# Patient Record
Sex: FEMALE | Race: BLACK OR AFRICAN AMERICAN | NOT HISPANIC OR LATINO | Employment: STUDENT | URBAN - METROPOLITAN AREA
[De-identification: names, ages, dates, MRNs, and addresses within clinical notes are randomized per-mention and may not be internally consistent; named-entity substitution may affect disease eponyms.]

---
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
## 2023-01-31 ENCOUNTER — HOSPITAL ENCOUNTER (EMERGENCY)
Facility: HOSPITAL | Age: 18
Discharge: HOME | End: 2023-01-31
Attending: EMERGENCY MEDICINE

## 2023-01-31 VITALS
OXYGEN SATURATION: 98 % | TEMPERATURE: 97.9 F | RESPIRATION RATE: 18 BRPM | DIASTOLIC BLOOD PRESSURE: 72 MMHG | WEIGHT: 162 LBS | SYSTOLIC BLOOD PRESSURE: 119 MMHG | HEART RATE: 70 BPM | BODY MASS INDEX: 24.63 KG/M2

## 2023-01-31 DIAGNOSIS — J01.90 ACUTE NON-RECURRENT SINUSITIS, UNSPECIFIED LOCATION: Primary | ICD-10-CM

## 2023-01-31 RX ORDER — FLUTICASONE PROPIONATE 50 MCG
1 SPRAY, SUSPENSION (ML) NASAL DAILY
Qty: 16 G | Refills: 0 | Status: SHIPPED | OUTPATIENT
Start: 2023-01-31

## 2023-01-31 RX ORDER — IBUPROFEN 600 MG/1
600 TABLET ORAL ONCE
Status: COMPLETED | OUTPATIENT
Start: 2023-01-31 | End: 2023-01-31

## 2023-01-31 RX ORDER — OXYCODONE HYDROCHLORIDE 5 MG/1
5 TABLET ORAL ONCE
Status: COMPLETED | OUTPATIENT
Start: 2023-01-31 | End: 2023-01-31

## 2023-01-31 RX ORDER — OXYMETAZOLINE HYDROCHLORIDE 0.05 G/100ML
2 SPRAY NASAL ONCE
Status: COMPLETED | OUTPATIENT
Start: 2023-01-31 | End: 2023-01-31

## 2023-01-31 RX ADMIN — OXYCODONE HYDROCHLORIDE 5 MG: 5 TABLET ORAL at 02:27

## 2023-01-31 RX ADMIN — IBUPROFEN 600 MG: 600 TABLET, FILM COATED ORAL at 01:57

## 2023-01-31 RX ADMIN — DEXAMETHASONE SODIUM PHOSPHATE 10 MG: 10 INJECTION, SOLUTION INTRAMUSCULAR; INTRAVENOUS at 01:56

## 2023-01-31 RX ADMIN — OXYMETAZOLINE HYDROCHLORIDE 2 SPRAY: 0.05 SPRAY NASAL at 01:57

## 2023-01-31 NOTE — ED PROVIDER NOTES
History  Chief Complaint   Patient presents with   • Facial Swelling     Pt c/o pressure around nose and swelling to face R > L side  With right side jaw pain and headache  Started about 2 days prior  43-year-old female, presenting with facial pressure and pain  Patient reports pain in bilateral maxillary face, worse on right side radiating down to lower face  Symptoms started 2 days ago, constant with no known modifying factors  Reports associated congestion, denies any fevers or cough  Currently smokes tobacco       History provided by:  Patient   used: No        Prior to Admission Medications   Prescriptions Last Dose Informant Patient Reported? Taking?   folic acid (FOLVITE) 1 mg tablet   No No   Sig: Take 1 tablet (1 mg total) by mouth daily   Patient not taking: Reported on 10/7/2022   ibuprofen (MOTRIN) 400 mg tablet   Yes No   Sig: Take by mouth every 6 (six) hours as needed for mild pain   Patient not taking: Reported on 9/1/2021   levETIRAcetam (KEPPRA) 750 mg tablet   No No   Sig: Take half a tab twice a day for 7 days, then one tab twice a day     Patient not taking: Reported on 10/7/2022   ondansetron (ZOFRAN-ODT) 4 mg disintegrating tablet   No No   Sig: Take 1 tablet (4 mg total) by mouth every 6 (six) hours as needed for nausea for up to 15 doses   Patient not taking: Reported on 10/7/2022      Facility-Administered Medications: None       Past Medical History:   Diagnosis Date   • Chronic disease of adenoids 5/1/2019    Referred to ENT   • Chronic tonsillar hypertrophy 6/11/2019   • Chronic tonsillitis 6/11/2019   • Enlarged lymph node in neck 5/1/2019    Secondary to adenoids and chronic sinus problems   • Lymph nodes enlarged     right larger than the left   • Seizure [facility])        Past Surgical History:   Procedure Laterality Date   • ID TONSILLECTOMY PRIMARY/SECONDARY AGE 10/> N/A 7/16/2019    Procedure: TONSILLECTOMY;  Surgeon: Deb Lewis MD;  Location: [address] [place] [address]; Service: ENT       Family History   Problem Relation Age of Onset   • Asthma Mother    • Diabetes Father      I have reviewed and agree with the history as documented  E-Cigarette/Vaping   • E-Cigarette Use Current Every Day User      E-Cigarette/Vaping Substances   • Nicotine Yes      Social History     Tobacco Use   • Smoking status: Some Days     Types: Cigarettes   • Smokeless tobacco: Never   • Tobacco comments:     Vaping   Vaping Use   • Vaping Use: Every day   • Substances: Nicotine   Substance Use Topics   • Alcohol use: Yes     Comment: no regular alcohol consumption, social on occasion   • Drug use: Yes     Types: Marijuana       Review of Systems   Constitutional: Negative  Negative for fever  HENT: Positive for congestion, sinus pressure and sinus pain  Eyes: Negative  Respiratory: Negative  Cardiovascular: Negative  Gastrointestinal: Negative  Skin: Negative  Physical Exam  Physical Exam  Vitals and nursing note reviewed  Constitutional:       General: She is not in acute distress  HENT:      Head: Normocephalic and atraumatic  Comments: No facial swelling  Mild right maxillary sinus tenderness  Right Ear: Tympanic membrane and ear canal normal       Left Ear: Tympanic membrane and ear canal normal       Nose: Congestion present  Comments: Bilateral turbinate swelling noted     Mouth/Throat:      Mouth: Mucous membranes are moist       Pharynx: Oropharynx is clear  No oropharyngeal exudate or posterior oropharyngeal erythema  Comments: No posterior oropharynx swelling or erythema  No gum erythema or swelling, no dental tenderness  Eyes:      Extraocular Movements: Extraocular movements intact  Pupils: Pupils are equal, round, and reactive to light  Cardiovascular:      Rate and Rhythm: Normal rate and regular rhythm  Pulmonary:      Effort: Pulmonary effort is normal       Breath sounds: Normal breath sounds     Abdominal:      Palpations: Abdomen is soft  Tenderness: There is no abdominal tenderness  Musculoskeletal:         General: Normal range of motion  Cervical back: Normal range of motion and neck supple  No rigidity or tenderness  Lymphadenopathy:      Cervical: No cervical adenopathy  Skin:     General: Skin is warm and dry  Neurological:      General: No focal deficit present  Mental Status: She is alert and oriented to person, place, and time  Motor: No weakness  Gait: Gait normal          Vital Signs  ED Triage Vitals [01/31/23 0142]   Temperature Pulse Respirations Blood Pressure SpO2   97 9 °F (36 6 °C) 91 18 134/85 100 %      Temp Source Heart Rate Source Patient Position - Orthostatic VS BP Location FiO2 (%)   Tympanic Monitor Sitting Right arm --      Pain Score       10 - Worst Possible Pain           Vitals:    01/31/23 0142   BP: 134/85   Pulse: 91   Patient Position - Orthostatic VS: Sitting         Visual Acuity      ED Medications  Medications   oxymetazoline (AFRIN) 0 05 % nasal spray 2 spray (2 sprays Each Nare Given 1/31/23 0157)   ibuprofen (MOTRIN) tablet 600 mg (600 mg Oral Given 1/31/23 0157)   dexamethasone oral liquid 10 mg 1 mL (10 mg Oral Given 1/31/23 0156)   oxyCODONE (ROXICODONE) IR tablet 5 mg (5 mg Oral Given 1/31/23 0227)       Diagnostic Studies  Results Reviewed     None                 No orders to display              Procedures  Procedures         ED Course  ED Course as of 01/31/23 0359 Tue Jan 31, 2023 0359 Patient sleeping, in no distress  Woken up, states she feels better  Feels likely related to acute sinusitis, no antibiotics indicated  Given Decadron in ED, instructed to use Flonase as well as over-the-counter medications as needed  Instructed to follow-up or return for any worsening or new concerning symptoms           CRAFFT    Flowsheet Row Most Recent Value   SBIRT (13-21 yo)    In order to provide better care to our patients, we are screening all of our patients for alcohol and drug use  Would it be okay to ask you these screening questions? No Filed at: 01/31/2023 0230                                          Medical Decision Making  73-year-old female, presenting with facial pain  Differential diagnosis includes sinusitis, dental abscess, cellulitis among other diagnoses  On exam, patient with no facial swelling, mild tenderness to right maxillary sinus  Patient noted to have nasal congestion and swelling, symptoms likely related to acute sinusitis  Will give medications in ED, monitor and reevaluate  Amount and/or Complexity of Data Reviewed  External Data Reviewed: notes  Risk  OTC drugs  Prescription drug management  Disposition  Final diagnoses:   Acute non-recurrent sinusitis, unspecified location     Time reflects when diagnosis was documented in both MDM as applicable and the Disposition within this note     Time User Action Codes Description Comment    1/31/2023  3:58 AM Joaquin Burch [J01 [id number]] Acute non-recurrent sinusitis, unspecified location       ED Disposition     ED Disposition   Discharge    Condition   Stable    Date/Time   Tue Jan 31, 2023  3:58 AM    Comment   Maria De Jesus Cheatum discharge to home/self care  Follow-up Information     Follow up With Specialties Details Why Contact Mirta Tejeda MD Internal Medicine   [address]  [phone]            Patient's Medications   Discharge Prescriptions    FLUTICASONE (FLONASE) 50 MCG/ACT NASAL SPRAY    1 spray into each nostril daily       Start Date: 1/31/2023 End Date: --       Order Dose: 1 spray       Quantity: 16 g    Refills: 0       No discharge procedures on file      PDMP Review     None          ED Provider  Electronically Signed by           Milena Webster MD  01/31/23 8336

## 2025-03-05 ENCOUNTER — APPOINTMENT (EMERGENCY)
Dept: RADIOLOGY | Facility: HOSPITAL | Age: 20
End: 2025-03-05

## 2025-03-05 ENCOUNTER — HOSPITAL ENCOUNTER (EMERGENCY)
Facility: HOSPITAL | Age: 20
Discharge: HOME | End: 2025-03-05
Attending: EMERGENCY MEDICINE

## 2025-03-05 VITALS
HEART RATE: 72 BPM | OXYGEN SATURATION: 100 % | SYSTOLIC BLOOD PRESSURE: 105 MMHG | DIASTOLIC BLOOD PRESSURE: 71 MMHG | WEIGHT: 170 LBS | TEMPERATURE: 99.1 F | RESPIRATION RATE: 20 BRPM | BODY MASS INDEX: 25.85 KG/M2

## 2025-03-05 DIAGNOSIS — R56.9 SEIZURE (HCC): Primary | ICD-10-CM

## 2025-03-05 LAB
ALBUMIN SERPL BCG-MCNC: 4.5 G/DL (ref 3.5–5)
ALP SERPL-CCNC: 53 U/L (ref 34–104)
ALT SERPL W P-5'-P-CCNC: 9 U/L (ref 7–52)
ANION GAP SERPL CALCULATED.3IONS-SCNC: 11 MMOL/L (ref 4–13)
AST SERPL W P-5'-P-CCNC: 15 U/L (ref 13–39)
BASOPHILS # BLD AUTO: 0.03 THOUSANDS/ÂΜL (ref 0–0.1)
BASOPHILS NFR BLD AUTO: 1 % (ref 0–1)
BILIRUB SERPL-MCNC: 0.28 MG/DL (ref 0.2–1)
BUN SERPL-MCNC: 12 MG/DL (ref 5–25)
CALCIUM SERPL-MCNC: 9.5 MG/DL (ref 8.4–10.2)
CHLORIDE SERPL-SCNC: 107 MMOL/L (ref 96–108)
CO2 SERPL-SCNC: 19 MMOL/L (ref 21–32)
CREAT SERPL-MCNC: 0.85 MG/DL (ref 0.6–1.3)
EOSINOPHIL # BLD AUTO: 0.08 THOUSAND/ÂΜL (ref 0–0.61)
EOSINOPHIL NFR BLD AUTO: 1 % (ref 0–6)
ERYTHROCYTE [DISTWIDTH] IN BLOOD BY AUTOMATED COUNT: 15.8 % (ref 11.6–15.1)
FLUAV AG UPPER RESP QL IA.RAPID: NEGATIVE
FLUBV AG UPPER RESP QL IA.RAPID: NEGATIVE
GFR SERPL CREATININE-BSD FRML MDRD: 98 ML/MIN/1.73SQ M
GLUCOSE SERPL-MCNC: 95 MG/DL (ref 65–140)
HCG SERPL QL: NEGATIVE
HCT VFR BLD AUTO: 35.7 % (ref 34.8–46.1)
HGB BLD-MCNC: 10.7 G/DL (ref 11.5–15.4)
IMM GRANULOCYTES # BLD AUTO: 0.02 THOUSAND/UL (ref 0–0.2)
IMM GRANULOCYTES NFR BLD AUTO: 0 % (ref 0–2)
LYMPHOCYTES # BLD AUTO: 1.11 THOUSANDS/ÂΜL (ref 0.6–4.47)
LYMPHOCYTES NFR BLD AUTO: 19 % (ref 14–44)
MCH RBC QN AUTO: 27 PG (ref 26.8–34.3)
MCHC RBC AUTO-ENTMCNC: 30 G/DL (ref 31.4–37.4)
MCV RBC AUTO: 90 FL (ref 82–98)
MONOCYTES # BLD AUTO: 0.44 THOUSAND/ÂΜL (ref 0.17–1.22)
MONOCYTES NFR BLD AUTO: 8 % (ref 4–12)
NEUTROPHILS # BLD AUTO: 4.11 THOUSANDS/ÂΜL (ref 1.85–7.62)
NEUTS SEG NFR BLD AUTO: 71 % (ref 43–75)
NRBC BLD AUTO-RTO: 0 /100 WBCS
PLATELET # BLD AUTO: 263 THOUSANDS/UL (ref 149–390)
PMV BLD AUTO: 10.7 FL (ref 8.9–12.7)
POTASSIUM SERPL-SCNC: 4.1 MMOL/L (ref 3.5–5.3)
PROT SERPL-MCNC: 7.7 G/DL (ref 6.4–8.4)
RBC # BLD AUTO: 3.96 MILLION/UL (ref 3.81–5.12)
SARS-COV+SARS-COV-2 AG RESP QL IA.RAPID: NEGATIVE
SODIUM SERPL-SCNC: 137 MMOL/L (ref 135–147)
WBC # BLD AUTO: 5.79 THOUSAND/UL (ref 4.31–10.16)

## 2025-03-05 PROCEDURE — 85025 COMPLETE CBC W/AUTO DIFF WBC: CPT | Performed by: EMERGENCY MEDICINE

## 2025-03-05 PROCEDURE — 99284 EMERGENCY DEPT VISIT MOD MDM: CPT | Performed by: EMERGENCY MEDICINE

## 2025-03-05 PROCEDURE — 99284 EMERGENCY DEPT VISIT MOD MDM: CPT

## 2025-03-05 PROCEDURE — 87811 SARS-COV-2 COVID19 W/OPTIC: CPT | Performed by: EMERGENCY MEDICINE

## 2025-03-05 PROCEDURE — 36415 COLL VENOUS BLD VENIPUNCTURE: CPT | Performed by: EMERGENCY MEDICINE

## 2025-03-05 PROCEDURE — 87804 INFLUENZA ASSAY W/OPTIC: CPT | Performed by: EMERGENCY MEDICINE

## 2025-03-05 PROCEDURE — 70450 CT HEAD/BRAIN W/O DYE: CPT

## 2025-03-05 PROCEDURE — 80053 COMPREHEN METABOLIC PANEL: CPT | Performed by: EMERGENCY MEDICINE

## 2025-03-05 PROCEDURE — 96365 THER/PROPH/DIAG IV INF INIT: CPT

## 2025-03-05 PROCEDURE — 84703 CHORIONIC GONADOTROPIN ASSAY: CPT | Performed by: EMERGENCY MEDICINE

## 2025-03-05 RX ORDER — ACETAMINOPHEN 10 MG/ML
1000 INJECTION, SOLUTION INTRAVENOUS ONCE
Status: COMPLETED | OUTPATIENT
Start: 2025-03-05 | End: 2025-03-05

## 2025-03-05 RX ORDER — GINSENG 100 MG
1 CAPSULE ORAL ONCE
Status: COMPLETED | OUTPATIENT
Start: 2025-03-05 | End: 2025-03-05

## 2025-03-05 RX ORDER — DIVALPROEX SODIUM 500 MG/1
500 TABLET, DELAYED RELEASE ORAL EVERY 12 HOURS SCHEDULED
Qty: 60 TABLET | Refills: 0 | Status: SHIPPED | OUTPATIENT
Start: 2025-03-05 | End: 2025-04-04

## 2025-03-05 RX ADMIN — BACITRACIN ZINC 1 SMALL APPLICATION: 500 OINTMENT TOPICAL at 12:03

## 2025-03-05 RX ADMIN — ACETAMINOPHEN 1000 MG: 10 INJECTION INTRAVENOUS at 12:03

## 2025-03-05 NOTE — ED NOTES
Pt requesting coffee. Provider approved and coffee brought to bedside.      France Carroll RN  03/05/25 3496

## 2025-03-05 NOTE — DISCHARGE INSTRUCTIONS
Follow-up with neurology for further care. Contact info provided below if needed.  Use over the counter medications as stated on the bottle as needed for pain control.  Take your new medications as prescribed until seen by neurology.  Keep wound clean.   Return to the ED with new or worsening symptoms.

## 2025-03-05 NOTE — Clinical Note
Maria De Jesus Torres was seen and treated in our emergency department on 3/5/2025.                Diagnosis: Seizure    Maria De Jesus  .    She may return on this date:          If you have any questions or concerns, please don't hesitate to call.      Margoth Barriga MD    ______________________________           _______________          _______________  Hospital Representative                              Date                                Time

## 2025-03-05 NOTE — ED PROVIDER NOTES
Time reflects when diagnosis was documented in both MDM as applicable and the Disposition within this note       Time User Action Codes Description Comment    3/5/2025  1:21 PM Margoth Barriga Add [R56.9] Seizure (HCC)           ED Disposition       ED Disposition   Discharge    Condition   Stable    Date/Time   Wed Mar 5, 2025  1:21 PM    Comment   Maria De Jesusderek Torres discharge to home/self care.                   Assessment & Plan       Medical Decision Making  Pt is a 18yo F who presents with seizure.     Likely secondary to underlying epilepsy not on AEDs.  However, will obtain blood work and CT scan to rule out any potential trigger or underlying cause.  See ED course for results and details.    Plan to discharge pt with f/u to neurology. Discussed returning the ED with new or worsening of symptoms. Discussed use of over the counter medications as stated on the bottle as needed for pain. Discussed taking new medication as prescribed until seen by neurology. Pt expressed understanding of discharge instructions, return precautions, and medication instructions and is stable for discharge at this time. All questions were answered and pt was discharged without incident.         Amount and/or Complexity of Data Reviewed  Labs: ordered. Decision-making details documented in ED Course.  Radiology: ordered. Decision-making details documented in ED Course.    Risk  OTC drugs.  Prescription drug management.        ED Course as of 03/05/25 1327   Wed Mar 05, 2025   1156 CBC and differential(!)  Reviewed and without actionable derangement.    1211 FLU/COVID Rapid Antigen (30 min. TAT) - Preferred screening test in ED  Negative.    1215 Comprehensive metabolic panel(!)  Reviewed and without actionable derangement.    1215 Carbon Dioxide(!): 19  Likely 2/2 seizure activity.    1222 PREGNANCY, SERUM: Negative  Negative.    1307 CT being read.    1311 CT head wo contrast  No acute intracranial abnormality.   1320 Discussed with  neurology as pt does not want to restart Keppra. Neurology recommending depakote. Pt made aware and is agreeable.        Medications   acetaminophen (Ofirmev) injection 1,000 mg (0 mg Intravenous Stopped 3/5/25 1226)   bacitracin topical ointment 1 small application (1 small application Topical Given 3/5/25 1203)       ED Risk Strat Scores                                                History of Present Illness       Chief Complaint   Patient presents with    Seizure - Prior Hx Of     Pt was at work had seizure lasting 4 min , pt had controlled fall to ground per witnesses abrasion to right side of head, pt denies taking meds for seizure. Pt co ha a/o x 4        Past Medical History:   Diagnosis Date    Chronic disease of adenoids 5/1/2019    Referred to ENT    Chronic tonsillar hypertrophy 6/11/2019    Chronic tonsillitis 6/11/2019    Enlarged lymph node in neck 5/1/2019    Secondary to adenoids and chronic sinus problems    Lymph nodes enlarged     right larger than the left    Seizure (HCC)       Past Surgical History:   Procedure Laterality Date    ND TONSILLECTOMY PRIMARY/SECONDARY AGE 10/> N/A 7/16/2019    Procedure: TONSILLECTOMY;  Surgeon: Fred Gardiner MD;  Location: [facility];  Service: ENT      Family History   Problem Relation Age of Onset    Asthma Mother     Diabetes Father       Social History     Tobacco Use    Smoking status: Some Days     Types: Cigarettes    Smokeless tobacco: Never    Tobacco comments:     Vaping   Vaping Use    Vaping status: Every Day    Substances: Nicotine   Substance Use Topics    Alcohol use: Yes     Comment: no regular alcohol consumption, social on occasion    Drug use: Yes     Types: Marijuana      E-Cigarette/Vaping    E-Cigarette Use Current Every Day User       E-Cigarette/Vaping Substances    Nicotine Yes       I have reviewed and agree with the history as documented.     Pt is a 20yo F who presents for seizure.  Patient reports history of epilepsy and states she is  not currently on any antiepileptics.  Patient reports she has not been for approximately 3 years.  Patient states she took herself off the medication as it made her seizures more frequent.  Patient does not remember what she was on, however per chart review it appears she was on Keppra.  Patient states in January she had 3 seizures but has not had any since that time.  Patient reports today she does not remember exactly what happened, however did wake up on the ground while at work.  Patient denying any complaints at this time.  Patient states she is otherwise healthy.        Objective       ED Triage Vitals [03/05/25 1122]   Temperature Pulse Blood Pressure Respirations SpO2 Patient Position - Orthostatic VS   99.1 °F (37.3 °C) 80 105/71 20 98 % Sitting      Temp Source Heart Rate Source BP Location FiO2 (%) Pain Score    Oral -- Right arm -- 8      Vitals      Date and Time Temp Pulse SpO2 Resp BP Pain Score FACES Pain Rating User   03/05/25 1322 -- 72 100 % -- -- -- -- RG   03/05/25 1252 -- 75 99 % -- -- -- -- RG   03/05/25 1222 -- 91 100 % -- -- -- -- RG   03/05/25 1152 -- 84 99 % -- -- -- -- RG   03/05/25 1122 -- -- -- -- 105/71 -- -- KR   03/05/25 1122 99.1 °F (37.3 °C) 80 98 % 20 -- 8 -- RG            Physical Exam  Vitals reviewed.   Constitutional:       General: She is not in acute distress.     Appearance: She is well-developed. She is not toxic-appearing or diaphoretic.   HENT:      Head: Normocephalic.        Right Ear: External ear normal.      Left Ear: External ear normal.      Nose: Nose normal.      Mouth/Throat:      Pharynx: Oropharynx is clear.   Eyes:      Extraocular Movements: Extraocular movements intact.      Pupils: Pupils are equal, round, and reactive to light.   Cardiovascular:      Rate and Rhythm: Normal rate and regular rhythm.      Heart sounds: Normal heart sounds.   Pulmonary:      Effort: Pulmonary effort is normal. No respiratory distress.      Breath sounds: Normal breath  sounds.   Abdominal:      General: There is no distension.      Palpations: Abdomen is soft.      Tenderness: There is no abdominal tenderness.   Musculoskeletal:         General: Normal range of motion.      Cervical back: Normal range of motion and neck supple.   Skin:     General: Skin is warm and dry.      Capillary Refill: Capillary refill takes less than 2 seconds.      Coloration: Skin is not pale.      Findings: No rash.   Neurological:      General: No focal deficit present.      Mental Status: She is alert and oriented to person, place, and time.      Cranial Nerves: No cranial nerve deficit.      Sensory: No sensory deficit.      Motor: No weakness.      Coordination: Coordination normal.      Gait: Gait normal.   Psychiatric:         Speech: Speech normal.         Behavior: Behavior is cooperative.         Results Reviewed       Procedure Component Value Units Date/Time    hCG, qualitative pregnancy [[id number]]  (Normal) Collected: 03/05/25 1148    Lab Status: Final result Specimen: Blood from Arm, Left Updated: 03/05/25 1220     Preg, Serum Negative    Comprehensive metabolic panel [[id number]]  (Abnormal) Collected: 03/05/25 1148    Lab Status: Final result Specimen: Blood from Arm, Left Updated: 03/05/25 1214     Sodium 137 mmol/L      Potassium 4.1 mmol/L      Chloride 107 mmol/L      CO2 19 mmol/L      ANION GAP 11 mmol/L      BUN 12 mg/dL      Creatinine 0.85 mg/dL      Glucose 95 mg/dL      Calcium 9.5 mg/dL      AST 15 U/L      ALT 9 U/L      Alkaline Phosphatase 53 U/L      Total Protein 7.7 g/dL      Albumin 4.5 g/dL      Total Bilirubin 0.28 mg/dL      eGFR 98 ml/min/1.73sq m     Narrative:      National Kidney Disease Foundation guidelines for Chronic Kidney Disease (CKD):     Stage 1 with normal or high GFR (GFR > 90 mL/min/1.73 square meters)    Stage 2 Mild CKD (GFR = 60-89 mL/min/1.73 square meters)    Stage 3A Moderate CKD (GFR = 45-59 mL/min/1.73 square meters)    Stage 3B Moderate CKD  (GFR = 30-44 mL/min/1.73 square meters)    Stage 4 Severe CKD (GFR = 15-29 mL/min/1.73 square meters)    Stage 5 End Stage CKD (GFR <15 mL/min/1.73 square meters)  Note: GFR calculation is accurate only with a steady state creatinine    FLU/COVID Rapid Antigen (30 min. TAT) - Preferred screening test in ED [[id number]]  (Normal) Collected: 03/05/25 1148    Lab Status: Final result Specimen: Nares from Nose Updated: 03/05/25 1211     SARS COV Rapid Antigen Negative     Influenza A Rapid Antigen Negative     Influenza B Rapid Antigen Negative    Narrative:      This test has been performed using the [employer] [place] 2 FLU+SARS Antigen test under the Emergency Use Authorization (EUA). This test has been validated by the  and verified by the performing laboratory. The [place] uses lateral flow immunofluorescent sandwich assay to detect SARS-COV, Influenza A and Influenza B Antigen.     The [employer] [place] 2 SARS Antigen test does not differentiate between SARS-CoV and SARS-CoV-2.     Negative results are presumptive and may be confirmed with a molecular assay, if necessary, for patient management. Negative results do not rule out SARS-CoV-2 or influenza infection and should not be used as the sole basis for treatment or patient management decisions. A negative test result may occur if the level of antigen in a sample is below the limit of detection of this test.     Positive results are indicative of the presence of viral antigens, but do not rule out bacterial infection or co-infection with other viruses.     All test results should be used as an adjunct to clinical observations and other information available to the provider.    FOR PEDIATRIC PATIENTS - copy/paste COVID Guidelines URL to browser: [email or web address]    CBC and differential [[id number]]  (Abnormal) Collected: 03/05/25 1148    Lab Status: Final result Specimen: Blood from Arm, Left Updated:  25 1155     WBC 5.79 Thousand/uL      RBC 3.96 Million/uL      Hemoglobin 10.7 g/dL      Hematocrit 35.7 %      MCV 90 fL      MCH 27.0 pg      MCHC 30.0 g/dL      RDW 15.8 %      MPV 10.7 fL      Platelets 263 Thousands/uL      nRBC 0 /100 WBCs      Segmented % 71 %      Immature Grans % 0 %      Lymphocytes % 19 %      Monocytes % 8 %      Eosinophils Relative 1 %      Basophils Relative 1 %      Absolute Neutrophils 4.11 Thousands/µL      Absolute Immature Grans 0.02 Thousand/uL      Absolute Lymphocytes 1.11 Thousands/µL      Absolute Monocytes 0.44 Thousand/µL      Eosinophils Absolute 0.08 Thousand/µL      Basophils Absolute 0.03 Thousands/µL             CT head wo contrast   Final Interpretation by Pablo Perez MD ( 130)      No acute intracranial abnormality. Consider follow-up MRI brain seizure protocol with and without contrast for further evaluation.                  Workstation performed: [id number]             Procedures    ED Medication and Procedure Management   Prior to Admission Medications   Prescriptions Last Dose Informant Patient Reported? Taking?   fluticasone (FLONASE) 50 mcg/act nasal spray   No No   Si spray into each nostril daily   folic acid (FOLVITE) 1 mg tablet   No No   Sig: Take 1 tablet (1 mg total) by mouth daily   Patient not taking: Reported on 10/7/2022   ibuprofen (MOTRIN) 400 mg tablet   Yes No   Sig: Take by mouth every 6 (six) hours as needed for mild pain   Patient not taking: Reported on 2021   levETIRAcetam (KEPPRA) 750 mg tablet   No No   Sig: Take half a tab twice a day for 7 days, then one tab twice a day.   Patient not taking: Reported on 10/7/2022   ondansetron (ZOFRAN-ODT) 4 mg disintegrating tablet   No No   Sig: Take 1 tablet (4 mg total) by mouth every 6 (six) hours as needed for nausea for up to 15 doses   Patient not taking: Reported on 10/7/2022      Facility-Administered Medications: None     Patient's Medications   Discharge  Prescriptions    DIVALPROEX SODIUM (DEPAKOTE) 500 MG DR TABLET    Take 1 tablet (500 mg total) by mouth every 12 (twelve) hours       Start Date: 3/5/2025  End Date: 4/4/2025       Order Dose: 500 mg       Quantity: 60 tablet    Refills: 0     No discharge procedures on file.  ED SEPSIS DOCUMENTATION   Time reflects when diagnosis was documented in both MDM as applicable and the Disposition within this note       Time User Action Codes Description Comment    3/5/2025  1:21 PM Margoth Barriga Add [R56.9] Seizure (HCC)                  Margoth Barriga MD  03/05/25 1327

## 2025-03-17 ENCOUNTER — OFFICE VISIT (OUTPATIENT)
Age: 20
End: 2025-03-17

## 2025-03-17 VITALS
BODY MASS INDEX: 25.2 KG/M2 | OXYGEN SATURATION: 98 % | WEIGHT: 176 LBS | HEIGHT: 70 IN | TEMPERATURE: 98.3 F | DIASTOLIC BLOOD PRESSURE: 63 MMHG | HEART RATE: 76 BPM | SYSTOLIC BLOOD PRESSURE: 124 MMHG | RESPIRATION RATE: 17 BRPM

## 2025-03-17 DIAGNOSIS — Z11.59 NEED FOR HEPATITIS C SCREENING TEST: ICD-10-CM

## 2025-03-17 DIAGNOSIS — Z23 ENCOUNTER FOR IMMUNIZATION: ICD-10-CM

## 2025-03-17 DIAGNOSIS — Z11.4 SCREENING FOR HIV (HUMAN IMMUNODEFICIENCY VIRUS): ICD-10-CM

## 2025-03-17 DIAGNOSIS — G40.409 OTHER GENERALIZED EPILEPSY, NOT INTRACTABLE, WITHOUT STATUS EPILEPTICUS (HCC): Primary | ICD-10-CM

## 2025-03-17 DIAGNOSIS — Z59.9 FINANCIAL DIFFICULTIES: ICD-10-CM

## 2025-03-17 PROCEDURE — 99203 OFFICE O/P NEW LOW 30 MIN: CPT | Performed by: FAMILY MEDICINE

## 2025-03-17 SDOH — ECONOMIC STABILITY - INCOME SECURITY: PROBLEM RELATED TO HOUSING AND ECONOMIC CIRCUMSTANCES, UNSPECIFIED: Z59.9

## 2025-03-17 NOTE — PROGRESS NOTES
Adult Annual Physical  Name: Maria De Jesus Torres      : 2005      MRN: [id number]  Encounter Provider: Hollis Byrd MD  Encounter Date: 3/17/2025   Encounter department: [facility]    Assessment & Plan  Other generalized epilepsy, not intractable, without status epilepticus (HCC)  Patient presents to the clinic to get her physical form filled out.  Patient has a history of seizures.  First seizure was in .  Patient was initially started on Keppra which she stopped taking as she felt it was making her seizures more frequent.   Patient presented to the ED 10 days ago with another seizure episode.  Patient was at work, had a seizure lasting for few minutes and fell to the ground to the right side of her head.  Imaging was unremarkable.  Patient was started on Depakote 100 mg every 12 hours.  He has been compliant with her medication    Plan  Continue with Depakote 100 mg every 12 hours  Advised patient to follow-up with neurology as soon as possible, referral given to neurology  Work physical form filled out  Advised patient to call clinic if she develops any side effects from medication      Orders:    Ambulatory Referral to Neurology; Future    Financial difficulties    Orders:    Ambulatory referral to social work care management program; Future    Screening for HIV (human immunodeficiency virus)    Orders:    HIV 1/2 AG/AB w Reflex [facility] for 2 yr old and above; Future    Need for hepatitis C screening test    Orders:    Hepatitis C antibody; Future    Preventive Screenings:  - Diabetes Screening: screening up-to-date  - Hepatitis C screening: orders placed   - HIV screening: orders placed   - Cervical cancer screening: risks/benefits discussed   - Colon cancer screening: screening not indicated   - Lung cancer screening: screening not indicated   - Prostate cancer screening: screening not indicated     Immunizations:  - Immunizations due: Influenza, Prevnar 20  and HPV (Gardasil 9)    BMI Counseling: Body mass index is 25.25 kg/m². The BMI is above normal. Nutrition recommendations include decreasing portion sizes, encouraging healthy choices of fruits and vegetables, decreasing fast food intake, consuming healthier snacks, limiting drinks that contain sugar and moderation in carbohydrate intake. Exercise recommendations include moderate physical activity 150 minutes/week and exercising 3-5 times per week. Rationale for BMI follow-up plan is due to patient being overweight or obese.         History of Present Illness       Seizures trigered by stress, dehydration  Feels lightheaded before seziure  LOC and fell on the sidewalk, hit head  Adult Annual Physical:  Patient presents for annual physical.     Diet and Physical Activity:  - Diet/Nutrition: no special diet.  - Exercise: walking.    Depression Screening:  - PHQ-2 Score: 0    General Health:  - Sleep: sleeps well.  - Hearing: normal hearing bilateral ears.  - Vision: no vision problems.  - Dental: regular dental visits.    /GYN Health:  - Follows with GYN: yes.   - Menopause: premenopausal.   - Last menstrual cycle: 3/3/2025.   - History of STDs: no    Review of Systems   Constitutional: Negative.  Negative for chills and fever.   HENT: Negative.  Negative for ear pain and sore throat.    Eyes:  Negative for pain and visual disturbance.   Respiratory: Negative.  Negative for cough and shortness of breath.    Cardiovascular: Negative.  Negative for chest pain and palpitations.   Gastrointestinal: Negative.  Negative for abdominal pain and vomiting.   Genitourinary:  Negative for dysuria and hematuria.   Musculoskeletal:  Negative for arthralgias and back pain.   Skin:  Negative for color change and rash.   Neurological:  Negative for dizziness, syncope, numbness and headaches.        History of seizures, last one at March 5th   Psychiatric/Behavioral: Negative.  Negative for sleep disturbance. The patient is not  nervous/anxious.    All other systems reviewed and are negative.    Medical History Reviewed by provider this encounter:  Meds  Problems     .  Past Medical History   Past Medical History:   Diagnosis Date    Chronic disease of adenoids 5/1/2019    Referred to ENT    Chronic tonsillar hypertrophy 6/11/2019    Chronic tonsillitis 6/11/2019    Enlarged lymph node in neck 5/1/2019    Secondary to adenoids and chronic sinus problems    Lymph nodes enlarged     right larger than the left    Seizure (HCC)      Past Surgical History:   Procedure Laterality Date    DE TONSILLECTOMY PRIMARY/SECONDARY AGE 10/> N/A 7/16/2019    Procedure: TONSILLECTOMY;  Surgeon: Fred Gardiner MD;  Location: [facility];  Service: ENT     Family History   Problem Relation Age of Onset    Asthma Mother     Diabetes Father       reports that she has been smoking cigarettes. She has never used smokeless tobacco. She reports current alcohol use. She reports current drug use. Drug: Marijuana.  Current Outpatient Medications   Medication Instructions    divalproex sodium (DEPAKOTE) 500 mg, Oral, Every 12 hours scheduled    fluticasone (FLONASE) 50 mcg/act nasal spray 1 spray, Nasal, Daily    folic acid (FOLVITE) 1 mg, Oral, Daily    ibuprofen (MOTRIN) 400 mg tablet Every 6 hours PRN    levETIRAcetam (KEPPRA) 750 mg tablet Take half a tab twice a day for 7 days, then one tab twice a day.    ondansetron (ZOFRAN-ODT) 4 mg, Oral, Every 6 hours PRN   No Known Allergies   Current Outpatient Medications on File Prior to Visit   Medication Sig Dispense Refill    divalproex sodium (Depakote) 500 mg DR tablet Take 1 tablet (500 mg total) by mouth every 12 (twelve) hours 60 tablet 0    fluticasone (FLONASE) 50 mcg/act nasal spray 1 spray into each nostril daily (Patient not taking: Reported on 3/17/2025) 16 g 0    folic acid (FOLVITE) 1 mg tablet Take 1 tablet (1 mg total) by mouth daily (Patient not taking: Reported on 3/17/2025) 90 tablet 1    ibuprofen  "(MOTRIN) 400 mg tablet Take by mouth every 6 (six) hours as needed for mild pain (Patient not taking: Reported on 3/17/2025)      levETIRAcetam (KEPPRA) 750 mg tablet Take half a tab twice a day for 7 days, then one tab twice a day. (Patient not taking: Reported on 3/17/2025) 60 tablet 5    ondansetron (ZOFRAN-ODT) 4 mg disintegrating tablet Take 1 tablet (4 mg total) by mouth every 6 (six) hours as needed for nausea for up to 15 doses (Patient not taking: Reported on 3/17/2025) 15 tablet 0     No current facility-administered medications on file prior to visit.      Social History     Tobacco Use    Smoking status: Some Days     Types: Cigarettes    Smokeless tobacco: Never    Tobacco comments:     Vaping   Vaping Use    Vaping status: Every Day    Substances: Nicotine, Flavoring   Substance and Sexual Activity    Alcohol use: Yes     Comment: no regular alcohol consumption, social on occasion    Drug use: Yes     Types: Marijuana    Sexual activity: Yes     Partners: Male     Birth control/protection: None       Objective   /63 (BP Location: Left arm, Patient Position: Sitting, Cuff Size: Adult)   Pulse 76   Temp 98.3 °F (36.8 °C)   Resp 17   Ht 5' 10\" (1.778 m)   Wt 79.8 kg (176 lb)   LMP 02/26/2025   SpO2 98%   BMI 25.25 kg/m²     Physical Exam  Vitals and nursing note reviewed.   Constitutional:       General: She is not in acute distress.     Appearance: She is well-developed. She is not ill-appearing, toxic-appearing or diaphoretic.   HENT:      Head: Normocephalic and atraumatic.   Eyes:      Conjunctiva/sclera: Conjunctivae normal.   Cardiovascular:      Rate and Rhythm: Normal rate and regular rhythm.      Pulses: Normal pulses.      Heart sounds: Normal heart sounds. No murmur heard.  Pulmonary:      Effort: Pulmonary effort is normal. No respiratory distress.      Breath sounds: Normal breath sounds. No rales.   Abdominal:      General: Bowel sounds are normal.      Palpations: Abdomen is " soft.      Tenderness: There is no abdominal tenderness.   Musculoskeletal:         General: No swelling.      Cervical back: Neck supple.      Right lower leg: No edema.      Left lower leg: No edema.   Skin:     General: Skin is warm and dry.      Capillary Refill: Capillary refill takes less than 2 seconds.   Neurological:      General: No focal deficit present.      Mental Status: She is alert and oriented to person, place, and time. Mental status is at baseline.   Psychiatric:         Mood and Affect: Mood normal.

## 2025-03-17 NOTE — ASSESSMENT & PLAN NOTE
Patient presents to the clinic to get her physical form filled out.  Patient has a history of seizures.  First seizure was in 2021.  Patient was initially started on Keppra which she stopped taking as she felt it was making her seizures more frequent.   Patient presented to the ED 10 days ago with another seizure episode.  Patient was at work, had a seizure lasting for few minutes and fell to the ground to the right side of her head.  Imaging was unremarkable.  Patient was started on Depakote 100 mg every 12 hours.  He has been compliant with her medication    Plan  Continue with Depakote 100 mg every 12 hours  Advised patient to follow-up with neurology as soon as possible, referral given to neurology  Work physical form filled out  Advised patient to call clinic if she develops any side effects from medication      Orders:    Ambulatory Referral to Neurology; Future

## 2025-03-18 ENCOUNTER — TELEPHONE (OUTPATIENT)
Age: 20
End: 2025-03-18

## 2025-03-18 NOTE — TELEPHONE ENCOUNTER
Dr. Byrd,    Patient stated that she emailed a form to you directly which she needs filled out by 3/20. Please advise.    We can fax once completed: [phone]

## 2025-03-18 NOTE — TELEPHONE ENCOUNTER
Form has been completed and placed in white team folder in the clerical area. Form needs to be faxed to : [phone]    Thank you  Dr Byrd

## 2025-03-24 ENCOUNTER — PATIENT OUTREACH (OUTPATIENT)
Age: 20
End: 2025-03-24

## 2025-03-24 NOTE — PROGRESS NOTES
SWCM received referral from provider to assist patient with needs, SDOH: financial needs.    SWCM completed chart review. SWCM called patient to follow up and assist with needs. SWCM spoke with patient. SWCM introduced self, role, and reason for outreach. Contact information provided.    Patient requesting to return SWCM's call at another time as she was at work. SWCM acknowledged patient's request.   SWCM encouraged patient to call with questions/ needs.     SWCM will await patient's call and remain available to assist as needed.

## 2025-04-03 ENCOUNTER — PATIENT OUTREACH (OUTPATIENT)
Age: 20
End: 2025-04-03

## 2025-04-03 NOTE — LETTER
04/03/25    Dear Maria De Jesus Torres,    I am a Care Manager with [address] [address] [phone].      We have made several attempts to call you by phone.  It is important that you contact us back at [phone] so that we can assist with your care needs.     Sincerely,     Lila Schroeder LCSW  Social Work Care Manager

## 2025-04-03 NOTE — PROGRESS NOTES
SWCM called patient to follow up and assist with needs. SWCM unable to reach patient (x2). Left voice message requesting return call. Contact information provided. SWCM sent UTR letter. SWCM closed case and removed self from care team.     SWCM will remain available to assist as needed.

## 2025-07-10 ENCOUNTER — TELEPHONE (OUTPATIENT)
Age: 20
End: 2025-07-10

## 2025-07-10 ENCOUNTER — OFFICE VISIT (OUTPATIENT)
Age: 20
End: 2025-07-10

## 2025-07-10 VITALS
HEIGHT: 71 IN | OXYGEN SATURATION: 100 % | BODY MASS INDEX: 21.94 KG/M2 | WEIGHT: 156.7 LBS | DIASTOLIC BLOOD PRESSURE: 62 MMHG | SYSTOLIC BLOOD PRESSURE: 111 MMHG | HEART RATE: 80 BPM

## 2025-07-10 DIAGNOSIS — Z00.00 ANNUAL PHYSICAL EXAM: Primary | ICD-10-CM

## 2025-07-10 DIAGNOSIS — Z23 ENCOUNTER FOR IMMUNIZATION: ICD-10-CM

## 2025-07-10 PROCEDURE — 99395 PREV VISIT EST AGE 18-39: CPT | Performed by: FAMILY MEDICINE

## 2025-07-10 NOTE — PATIENT INSTRUCTIONS
"Patient Education     Routine physical for adults   The Basics   Written by the doctors and editors at [facility]   What is a physical? -- A physical is a routine visit, or \"check-up,\" with your doctor. You might also hear it called a \"wellness visit\" or \"preventive visit.\"  During each visit, the doctor will:   Ask about your physical and mental health   Ask about your habits, behaviors, and lifestyle   Do an exam   Give you vaccines if needed   Talk to you about any medicines you take   Give advice about your health   Answer your questions  Getting regular check-ups is an important part of taking care of your health. It can help your doctor find and treat any problems you have. But it's also important for preventing health problems.  A routine physical is different from a \"sick visit.\" A sick visit is when you see a doctor because of a health concern or problem. Since physicals are scheduled ahead of time, you can think about what you want to ask the doctor.  How often should I get a physical? -- It depends on your age and health. In general, for people age 21 years and older:   If you are younger than 50 years, you might be able to get a physical every 3 years.   If you are 50 years or older, your doctor might recommend a physical every year.  If you have an ongoing health condition, like diabetes or high blood pressure, your doctor will probably want to see you more often.  What happens during a physical? -- In general, each visit will include:   Physical exam - The doctor or nurse will check your height, weight, heart rate, and blood pressure. They will also look at your eyes and ears. They will ask about how you are feeling and whether you have any symptoms that bother you.   Medicines - It's a good idea to bring a list of all the medicines you take to each doctor visit. Your doctor will talk to you about your medicines and answer any questions. Tell them if you are having any side effects that bother you. You " "should also tell them if you are having trouble paying for any of your medicines.   Habits and behaviors - This includes:   Your diet   Your exercise habits   Whether you smoke, drink alcohol, or use drugs   Whether you are sexually active   Whether you feel safe at home  Your doctor will talk to you about things you can do to improve your health and lower your risk of health problems. They will also offer help and support. For example, if you want to quit smoking, they can give you advice and might prescribe medicines. If you want to improve your diet or get more physical activity, they can help you with this, too.   Lab tests, if needed - The tests you get will depend on your age and situation. For example, your doctor might want to check your:   Cholesterol   Blood sugar   Iron level   Vaccines - The recommended vaccines will depend on your age, health, and what vaccines you already had. Vaccines are very important because they can prevent certain serious or deadly infections.   Discussion of screening - \"Screening\" means checking for diseases or other health problems before they cause symptoms. Your doctor can recommend screening based on your age, risk, and preferences. This might include tests to check for:   Cancer, such as breast, prostate, cervical, ovarian, colorectal, prostate, lung, or skin cancer   Sexually transmitted infections, such as chlamydia and gonorrhea   Mental health conditions like depression and anxiety  Your doctor will talk to you about the different types of screening tests. They can help you decide which screenings to have. They can also explain what the results might mean.   Answering questions - The physical is a good time to ask the doctor or nurse questions about your health. If needed, they can refer you to other doctors or specialists, too.  Adults older than 65 years often need other care, too. As you get older, your doctor will talk to you about:   How to prevent falling at " home   Hearing or vision tests   Memory testing   How to take your medicines safely   Making sure that you have the help and support you need at home  All topics are updated as new evidence becomes available and our peer review process is complete.  This topic retrieved from [employer] on: May 02, 2024.  Topic 690367 Version 1.0  Release: 32.4.3 - C32.122  © 2024 UpToDate, Inc. and/or its affiliates. All rights reserved.  Consumer Information Use and Disclaimer   Disclaimer: This generalized information is a limited summary of diagnosis, treatment, and/or medication information. It is not meant to be comprehensive and should be used as a tool to help the user understand and/or assess potential diagnostic and treatment options. It does NOT include all information about conditions, treatments, medications, side effects, or risks that may apply to a specific patient. It is not intended to be medical advice or a substitute for the medical advice, diagnosis, or treatment of a health care provider based on the health care provider's examination and assessment of a patient's specific and unique circumstances. Patients must speak with a health care provider for complete information about their health, medical questions, and treatment options, including any risks or benefits regarding use of medications. This information does not endorse any treatments or medications as safe, effective, or approved for treating a specific patient. UpToDate, Inc. and its affiliates disclaim any warranty or liability relating to this information or the use thereof.The use of this information is governed by the Terms of Use, available at https://www.woltersFlagTapuwer.com/en/know/clinical-effectiveness-terms. 2024© UpToDate, Inc. and its affiliates and/or licensors. All rights reserved.  Copyright   © 2024 Up[employer]Date, Inc. and/or its affiliates. All rights reserved.

## 2025-07-10 NOTE — PROGRESS NOTES
Adult Annual Physical  Name: Maria De Jesus Torres      : 2005      MRN: [id number]  Encounter Provider: Alma Bravo MD  Encounter Date: 7/10/2025   Encounter department: [facility]    :  Assessment & Plan  Annual physical exam  20 yr old Maria De Jesus Torres comes to the clinic for an annual physical. She is doing well without any complaints.   Would like to set up women's health visit in the following week.     Filled out accomodation forms to let patient not work in extreme heat conditions given her h/o of epilepsy.    Plan  F/u for womens health visit for cervical cancer screening and std panel.          Encounter for immunization  Due for HPV, declined would like to be vaccinated next week.            Preventive Screenings:  - Diabetes Screening: screening up-to-date, risks/benefits discussed and screening not indicated  - Cholesterol Screening: risks/benefits discussed and screening not indicated   - Hepatitis C screening: risks/benefits discussed and patient declines   - HIV screening: risks/benefits discussed and patient declines   - Cervical cancer screening: risks/benefits discussed and patient declines   - Colon cancer screening: risks/benefits discussed and screening not indicated   - Lung cancer screening: screening not indicated     Immunizations:  - Immunizations due: Prevnar 20 and HPV (Gardasil 9)    BMI Counseling: Body mass index is 21.86 kg/m². The BMI is above normal. Nutrition recommendations include encouraging healthy choices of fruits and vegetables, decreasing fast food intake, consuming healthier snacks, limiting drinks that contain sugar and reducing intake of cholesterol. Exercise recommendations include moderate physical activity 150 minutes/week and exercising 3-5 times per week.         History of Present Illness     Adult Annual Physical:  Patient presents for annual physical.     Diet and Physical Activity:  - Diet/Nutrition: well balanced  diet.  - Exercise: 1-2 times a week on average and walking.    Depression Screening:  - PHQ-2 Score: 1    General Health:  - Sleep: 7-8 hours of sleep on average.  - Hearing: normal hearing bilateral ears.  - Vision: no vision problems.  - Dental: regular dental visits.    /GYN Health:  - Follows with GYN: no.   - History of STDs: no  - Contraception:. no      Review of Systems   Constitutional:  Negative for chills and fever.   HENT:  Negative for ear pain and sore throat.    Eyes:  Negative for pain and visual disturbance.   Respiratory:  Negative for cough and shortness of breath.    Cardiovascular:  Negative for chest pain and palpitations.   Gastrointestinal:  Negative for abdominal pain and vomiting.   Genitourinary:  Negative for dysuria and hematuria.   Musculoskeletal:  Negative for arthralgias and back pain.   Skin:  Negative for color change and rash.   Neurological:  Negative for seizures and syncope.   All other systems reviewed and are negative.    Pertinent Medical History         Medical History Reviewed by provider this encounter:     .  Past Medical History   Past Medical History[1]  Past Surgical History[2]  Family History[3]   reports that she has been smoking cigarettes. She has never used smokeless tobacco. She reports current alcohol use. She reports current drug use. Drug: Marijuana.  Current Outpatient Medications   Medication Instructions    divalproex sodium (DEPAKOTE) 500 mg, Oral, Every 12 hours scheduled    fluticasone (FLONASE) 50 mcg/act nasal spray 1 spray, Nasal, Daily    folic acid (FOLVITE) 1 mg, Oral, Daily    ibuprofen (MOTRIN) 400 mg tablet Every 6 hours PRN    levETIRAcetam (KEPPRA) 750 mg tablet Take half a tab twice a day for 7 days, then one tab twice a day.    ondansetron (ZOFRAN-ODT) 4 mg, Oral, Every 6 hours PRN   Allergies[4]   Medications Ordered Prior to Encounter[5]   Social History[6]    Objective   There were no vitals taken for this visit.    Physical  Exam  Vitals and nursing note reviewed.   Constitutional:       General: She is not in acute distress.     Appearance: She is well-developed.   HENT:      Head: Normocephalic and atraumatic.     Eyes:      Conjunctiva/sclera: Conjunctivae normal.       Cardiovascular:      Rate and Rhythm: Normal rate and regular rhythm.      Pulses: Normal pulses.      Heart sounds: Normal heart sounds. No murmur heard.  Pulmonary:      Effort: Pulmonary effort is normal. No respiratory distress.      Breath sounds: Normal breath sounds.   Abdominal:      Palpations: Abdomen is soft.      Tenderness: There is no abdominal tenderness.     Musculoskeletal:         General: No swelling.      Cervical back: Neck supple.     Skin:     General: Skin is warm and dry.      Capillary Refill: Capillary refill takes less than 2 seconds.     Neurological:      General: No focal deficit present.      Mental Status: She is alert and oriented to person, place, and time. Mental status is at baseline.     Psychiatric:         Mood and Affect: Mood normal.         Behavior: Behavior normal.         Thought Content: Thought content normal.         Judgment: Judgment normal.              [1]   Past Medical History:  Diagnosis Date    Chronic disease of adenoids 5/1/2019    Referred to ENT    Chronic tonsillar hypertrophy 6/11/2019    Chronic tonsillitis 6/11/2019    Enlarged lymph node in neck 5/1/2019    Secondary to adenoids and chronic sinus problems    Lymph nodes enlarged     right larger than the left    Seizure (HCC)    [2]   Past Surgical History:  Procedure Laterality Date    [place] TONSILLECTOMY PRIMARY/SECONDARY AGE 10/> N/A 7/16/2019    Procedure: TONSILLECTOMY;  Surgeon: Fred Gardiner MD;  Location: [facility];  Service: ENT   [3]   Family History  Problem Relation Name Age of Onset    Asthma Mother      Diabetes Father     [4] No Known Allergies  [5]   Current Outpatient Medications on File Prior to Visit   Medication Sig Dispense Refill     divalproex sodium (Depakote) 500 mg DR tablet Take 1 tablet (500 mg total) by mouth every 12 (twelve) hours 60 tablet 0    fluticasone (FLONASE) 50 mcg/act nasal spray 1 spray into each nostril daily (Patient not taking: Reported on 7/10/2025) 16 g 0    folic acid (FOLVITE) 1 mg tablet Take 1 tablet (1 mg total) by mouth daily (Patient not taking: Reported on 3/17/2025) 90 tablet 1    ibuprofen (MOTRIN) 400 mg tablet Take by mouth every 6 (six) hours as needed for mild pain (Patient not taking: Reported on 3/17/2025)      levETIRAcetam (KEPPRA) 750 mg tablet Take half a tab twice a day for 7 days, then one tab twice a day. (Patient not taking: Reported on 3/17/2025) 60 tablet 5    ondansetron (ZOFRAN-ODT) 4 mg disintegrating tablet Take 1 tablet (4 mg total) by mouth every 6 (six) hours as needed for nausea for up to 15 doses (Patient not taking: Reported on 3/17/2025) 15 tablet 0     No current facility-administered medications on file prior to visit.   [6]   Social History  Tobacco Use    Smoking status: Some Days     Types: Cigarettes    Smokeless tobacco: Never    Tobacco comments:     Vaping   Vaping Use    Vaping status: Every Day    Substances: Nicotine, Flavoring   Substance and Sexual Activity    Alcohol use: Yes     Comment: no regular alcohol consumption, social on occasion    Drug use: Yes     Types: Marijuana    Sexual activity: Yes     Partners: Male     Birth control/protection: None

## 2025-07-22 ENCOUNTER — APPOINTMENT (EMERGENCY)
Dept: RADIOLOGY | Facility: HOSPITAL | Age: 20
End: 2025-07-22

## 2025-07-22 ENCOUNTER — HOSPITAL ENCOUNTER (EMERGENCY)
Facility: HOSPITAL | Age: 20
Discharge: HOME | End: 2025-07-22
Attending: EMERGENCY MEDICINE | Admitting: EMERGENCY MEDICINE

## 2025-07-22 VITALS
OXYGEN SATURATION: 98 % | WEIGHT: 156 LBS | BODY MASS INDEX: 22.33 KG/M2 | TEMPERATURE: 98.5 F | RESPIRATION RATE: 16 BRPM | HEIGHT: 70 IN | SYSTOLIC BLOOD PRESSURE: 108 MMHG | DIASTOLIC BLOOD PRESSURE: 63 MMHG | HEART RATE: 93 BPM

## 2025-07-22 DIAGNOSIS — S83.91XA SPRAIN OF RIGHT KNEE, UNSPECIFIED LIGAMENT, INITIAL ENCOUNTER: Primary | ICD-10-CM

## 2025-07-22 PROCEDURE — 73564 X-RAY EXAM KNEE 4 OR MORE: CPT

## 2025-07-22 PROCEDURE — 99283 EMERGENCY DEPT VISIT LOW MDM: CPT

## 2025-07-22 PROCEDURE — 99284 EMERGENCY DEPT VISIT MOD MDM: CPT | Performed by: EMERGENCY MEDICINE

## 2025-07-22 RX ORDER — OXYCODONE HYDROCHLORIDE 5 MG/1
5 TABLET ORAL EVERY 4 HOURS PRN
Qty: 6 TABLET | Refills: 0 | Status: SHIPPED | OUTPATIENT
Start: 2025-07-22 | End: 2025-08-01

## 2025-07-22 RX ORDER — OXYCODONE AND ACETAMINOPHEN 5; 325 MG/1; MG/1
1 TABLET ORAL ONCE
Refills: 0 | Status: COMPLETED | OUTPATIENT
Start: 2025-07-22 | End: 2025-07-22

## 2025-07-22 RX ADMIN — OXYCODONE HYDROCHLORIDE AND ACETAMINOPHEN 1 TABLET: 5; 325 TABLET ORAL at 20:24

## 2025-07-23 NOTE — ED PROVIDER NOTES
ED Disposition       None          Assessment & Plan       Medical Decision Making  Knee pain: No fracture seen on x-ray.  Patient does have swollen knee but no instability.  Due to extent of pain, knee immobilizer was placed and need for Ortho follow-up discussed.    Amount and/or Complexity of Data Reviewed  Radiology: ordered and independent interpretation performed.    Risk  Prescription drug management.        ED Course as of 07/22/25 2056 Tue Jul 22, 2025 2026 Differential includes fracture versus sprain   2056 Static velcro knee immobilizer splint was placed by tech.  Examined by me post splint placement.  Splint is in good position and neurovascular exam intact after splint placement.         Medications   oxyCODONE-acetaminophen (PERCOCET) 5-325 mg per tablet 1 tablet (1 tablet Oral Given 7/22/25 2024)       ED Risk Strat Scores                    No data recorded        SBIRT 22yo+      Flowsheet Row Most Recent Value   Initial Alcohol Screen: US AUDIT-C     1. How often do you have a drink containing alcohol? 0 Filed at: 07/22/2025 2012   2. How many drinks containing alcohol do you have on a typical day you are drinking?  0 Filed at: 07/22/2025 2012   3a. Male UNDER 65: How often do you have five or more drinks on one occasion? 0 Filed at: 07/22/2025 2012   3b. FEMALE Any Age, or MALE 65+: How often do you have 4 or more drinks on one occassion? 0 Filed at: 07/22/2025 2012   Audit-C Score 0 Filed at: 07/22/2025 2012   TEO: How many times in the past year have you...    Used an illegal drug or used a prescription medication for non-medical reasons? Never Filed at: 07/22/2025 2012                            History of Present Illness       Chief Complaint   Patient presents with    Knee Pain     Reports playing on the playground when she fell and hurt her R knee around 11am this morning. Took tylenol around 2pm with no relief.       Past Medical History[1]   Past Surgical History[2]   Family  History[3]   Social History[4]   E-Cigarette/Vaping    E-Cigarette Use Current Every Day User       E-Cigarette/Vaping Substances    Nicotine Yes     Flavoring Yes       I have reviewed and agree with the history as documented.     Patient reports that approximate 11:00am today she fell from playground and twisted her right knee.  She felt a pop but did not have severe pain until an hour and a half later when she had more pain and swelling.  The pain is diffuse and in both inner and outer aspects of right knee.      Knee Pain      Review of Systems   All other systems reviewed and are negative.          Objective       ED Triage Vitals [07/22/25 2012]   Temperature Pulse Blood Pressure Respirations SpO2 Patient Position - Orthostatic VS   98.5 °F (36.9 °C) 93 108/63 16 98 % Sitting      Temp Source Heart Rate Source BP Location FiO2 (%) Pain Score    Oral Monitor Left arm -- 9      Vitals      Date and Time Temp Pulse SpO2 Resp BP Pain Score FACES Pain Rating User   07/22/25 2024 -- -- -- -- -- 9 -- AF   07/22/25 2012 98.5 °F (36.9 °C) 93 98 % 16 108/63 9 -- DS            Physical Exam  Vitals and nursing note reviewed.   Constitutional:       Appearance: She is normal weight.   HENT:      Head: Normocephalic and atraumatic.      Nose: Nose normal.      Mouth/Throat:      Mouth: Mucous membranes are moist.     Eyes:      Conjunctiva/sclera: Conjunctivae normal.     Pulmonary:      Effort: Pulmonary effort is normal.     Musculoskeletal:         General: Tenderness (No focal patella tenderness.  Medial and lateral right knee tenderness.  Mild swelling without ligamentous laxity.  Negative Janine's sign.  Negative anterior drawer.) present. Normal range of motion.      Cervical back: Normal range of motion.     Skin:     General: Skin is warm and dry.     Neurological:      General: No focal deficit present.      Mental Status: She is alert.     Psychiatric:         Mood and Affect: Mood normal.         Results  Reviewed       None            XR knee 4+ views Right injury    (Results Pending)       Procedures    ED Medication and Procedure Management   Prior to Admission Medications   Prescriptions Last Dose Informant Patient Reported? Taking?   divalproex sodium (Depakote) 500 mg DR tablet   No No   Sig: Take 1 tablet (500 mg total) by mouth every 12 (twelve) hours      Facility-Administered Medications: None     Patient's Medications   Discharge Prescriptions    No medications on file     No discharge procedures on file.  ED SEPSIS DOCUMENTATION                [1]   Past Medical History:  Diagnosis Date    Chronic disease of adenoids 5/1/2019    Referred to ENT    Chronic tonsillar hypertrophy 6/11/2019    Chronic tonsillitis 6/11/2019    Enlarged lymph node in neck 5/1/2019    Secondary to adenoids and chronic sinus problems    Lymph nodes enlarged     right larger than the left    Seizure (HCC)    [2]   Past Surgical History:  Procedure Laterality Date    [place] TONSIL[facility]OM[facility] PRIMARY/SECONDARY AGE 10/> N/A 7/16/2019    Procedure: TONSILLECTOMY;  Surgeon: Fred Gardiner MD;  Location: [facility];  Service: ENT   [3]   Family History  Problem Relation Name Age of Onset    Asthma Mother      Diabetes Father     [4]   Social History  Tobacco Use    Smoking status: Some Days     Types: Cigarettes    Smokeless tobacco: Never    Tobacco comments:     Vaping   Vaping Use    Vaping status: Every Day    Substances: Nicotine, Flavoring   Substance Use Topics    Alcohol use: Yes     Comment: no regular alcohol consumption, social on occasion    Drug use: Yes     Types: Marijuana        Rinku Duncan MD  07/22/25 5816

## (undated) DEVICE — PACK GENERAL LF

## (undated) DEVICE — ASTOUND STANDARD SURGICAL GOWN, XL: Brand: CONVERTORS

## (undated) DEVICE — GLOVE SRG BIOGEL 7

## (undated) DEVICE — BASIC DOUBLE BASIN 2-LF: Brand: MEDLINE INDUSTRIES, INC.

## (undated) DEVICE — PAD GROUNDING ADULT

## (undated) DEVICE — WAND COBLATION  EVAC 70 XTRA TONSIL

## (undated) DEVICE — SUT CHROMIC 2-0 SH 27 IN G123H

## (undated) DEVICE — STRAIGHT CATH RED RUBBER 12FR

## (undated) DEVICE — ANTI-FOG SOLUTION WITH FOAM PAD: Brand: DEVON